# Patient Record
Sex: FEMALE | Race: OTHER | HISPANIC OR LATINO | URBAN - METROPOLITAN AREA
[De-identification: names, ages, dates, MRNs, and addresses within clinical notes are randomized per-mention and may not be internally consistent; named-entity substitution may affect disease eponyms.]

---

## 2019-06-07 ENCOUNTER — EMERGENCY (EMERGENCY)
Facility: HOSPITAL | Age: 49
LOS: 1 days | Discharge: HOME | End: 2019-06-07
Attending: EMERGENCY MEDICINE
Payer: SELF-PAY

## 2019-06-07 VITALS
DIASTOLIC BLOOD PRESSURE: 81 MMHG | HEART RATE: 90 BPM | SYSTOLIC BLOOD PRESSURE: 140 MMHG | RESPIRATION RATE: 18 BRPM | WEIGHT: 163.14 LBS | TEMPERATURE: 98 F | HEIGHT: 65 IN

## 2019-06-07 LAB
ALBUMIN SERPL ELPH-MCNC: 4 G/DL — SIGNIFICANT CHANGE UP (ref 3.3–5.2)
ALP SERPL-CCNC: 71 U/L — SIGNIFICANT CHANGE UP (ref 40–120)
ALT FLD-CCNC: 14 U/L — SIGNIFICANT CHANGE UP
ANION GAP SERPL CALC-SCNC: 13 MMOL/L — SIGNIFICANT CHANGE UP (ref 5–17)
APTT BLD: 30.6 SEC — SIGNIFICANT CHANGE UP (ref 27.5–36.3)
AST SERPL-CCNC: 14 U/L — SIGNIFICANT CHANGE UP
BILIRUB SERPL-MCNC: 0.3 MG/DL — LOW (ref 0.4–2)
BUN SERPL-MCNC: 17 MG/DL — SIGNIFICANT CHANGE UP (ref 8–20)
CALCIUM SERPL-MCNC: 9.5 MG/DL — SIGNIFICANT CHANGE UP (ref 8.6–10.2)
CHLORIDE SERPL-SCNC: 105 MMOL/L — SIGNIFICANT CHANGE UP (ref 98–107)
CO2 SERPL-SCNC: 22 MMOL/L — SIGNIFICANT CHANGE UP (ref 22–29)
CREAT SERPL-MCNC: 0.65 MG/DL — SIGNIFICANT CHANGE UP (ref 0.5–1.3)
GLUCOSE SERPL-MCNC: 92 MG/DL — SIGNIFICANT CHANGE UP (ref 70–115)
HCT VFR BLD CALC: 37 % — SIGNIFICANT CHANGE UP (ref 37–47)
HGB BLD-MCNC: 11.8 G/DL — LOW (ref 12–16)
INR BLD: 0.97 RATIO — SIGNIFICANT CHANGE UP (ref 0.88–1.16)
LIDOCAIN IGE QN: 32 U/L — SIGNIFICANT CHANGE UP (ref 22–51)
MCHC RBC-ENTMCNC: 24.4 PG — LOW (ref 27–31)
MCHC RBC-ENTMCNC: 31.9 G/DL — LOW (ref 32–36)
MCV RBC AUTO: 76.4 FL — LOW (ref 81–99)
NT-PROBNP SERPL-SCNC: 176 PG/ML — SIGNIFICANT CHANGE UP (ref 0–300)
PLATELET # BLD AUTO: 299 K/UL — SIGNIFICANT CHANGE UP (ref 150–400)
POTASSIUM SERPL-MCNC: 4.1 MMOL/L — SIGNIFICANT CHANGE UP (ref 3.5–5.3)
POTASSIUM SERPL-SCNC: 4.1 MMOL/L — SIGNIFICANT CHANGE UP (ref 3.5–5.3)
PROT SERPL-MCNC: 7.2 G/DL — SIGNIFICANT CHANGE UP (ref 6.6–8.7)
PROTHROM AB SERPL-ACNC: 11.2 SEC — SIGNIFICANT CHANGE UP (ref 10–12.9)
RBC # BLD: 4.84 M/UL — SIGNIFICANT CHANGE UP (ref 4.4–5.2)
RBC # FLD: 24.9 % — HIGH (ref 11–15.6)
SODIUM SERPL-SCNC: 140 MMOL/L — SIGNIFICANT CHANGE UP (ref 135–145)
TROPONIN T SERPL-MCNC: <0.01 NG/ML — SIGNIFICANT CHANGE UP (ref 0–0.06)
TROPONIN T SERPL-MCNC: <0.01 NG/ML — SIGNIFICANT CHANGE UP (ref 0–0.06)
WBC # BLD: 11.7 K/UL — HIGH (ref 4.8–10.8)
WBC # FLD AUTO: 11.7 K/UL — HIGH (ref 4.8–10.8)

## 2019-06-07 PROCEDURE — 99218: CPT

## 2019-06-07 PROCEDURE — 93010 ELECTROCARDIOGRAM REPORT: CPT

## 2019-06-07 PROCEDURE — 71045 X-RAY EXAM CHEST 1 VIEW: CPT | Mod: 26

## 2019-06-07 RX ORDER — ACETAMINOPHEN 500 MG
650 TABLET ORAL EVERY 6 HOURS
Refills: 0 | Status: DISCONTINUED | OUTPATIENT
Start: 2019-06-07 | End: 2019-06-12

## 2019-06-07 NOTE — ED CDU PROVIDER INITIAL DAY NOTE - ATTENDING CONTRIBUTION TO CARE
CP WORK UP  RECENT HX PERICARDITIS AS PER PT IN HER COUN[place]  I, Michelle Pittman, performed the initial face to face bedside interview with this patient regarding history of present illness, review of symptoms and relevant past medical, social and family history.  I completed an independent physical examination.  I was the initial provider who evaluated this patient. I have signed out the follow up of any pending tests (i.e. labs, radiological studies) to the ACP.  I have communicated the patient’s plan of care and disposition with the ACP.

## 2019-06-07 NOTE — ED CDU PROVIDER INITIAL DAY NOTE - OBJECTIVE STATEMENT
47yo F pmhx HTN, pericarditis x 1 month ago, spontaneous pneumothorax in 2007 presents to ED with chest pain, midsternal radiates up with palpitations, feels heart beating in chest x 1 week. Pain is intermittent and not exertional, episode usually lasts 10 minutes and self-resolves. Not worse with lying down flat. No further complaints. No hx MI or CVA. No fevers/chills, no SOB, diaphoresis, left arm pain, numbness/tingling/weakness, dizziness. Pt has seen cardiologist in [place] due to pericarditis but never in [place].

## 2019-06-07 NOTE — ED ADULT NURSE NOTE - OBJECTIVE STATEMENT
48 year old female, PMHx of asthma and pericarditis. presents to the ED with intermittent mid sternal chest pain x 1 weeks. Patient states that the pain feels like a squeezing. Patient denies any SOB, dizziness, lightheadedness, diaphoresis, N/V/D, fevers or chills. Patient states that she is having some chest discomfort at this time. Patient NSR on the monitor. respirations are even and non labored.

## 2019-06-07 NOTE — ED PROVIDER NOTE - NS ED ROS FT
ROS: +CP no SOB. +palpations. no cough. no fever. no n/v/d/c. no abd pain. no rash. no bleeding. no urinary complaints. no weakness. no vision changes. no HA. no neck/back pain. no extremity swelling/deformity. No change in mental status.

## 2019-06-07 NOTE — ED ADULT NURSE NOTE - CHPI ED NUR SYMPTOMS NEG
no back pain/no dizziness/no diaphoresis/no fever/no shortness of breath/no vomiting/no nausea/no congestion/no syncope/no chills

## 2019-06-07 NOTE — ED ADULT NURSE REASSESSMENT NOTE - GENERAL PATIENT STATE
improvement verbalized/family/SO at bedside/comfortable appearance/resting/sleeping/smiling/interactive

## 2019-06-07 NOTE — ED ADULT TRIAGE NOTE - CHIEF COMPLAINT QUOTE
pt reports chest pain x 1 week, has a history of Pericarditis. [place]. denies SOB, denies recent illness denies fevers.

## 2019-06-07 NOTE — ED STATDOCS - NS_ ATTENDINGSCRIBEDETAILS _ED_A_ED_FT
I, Uche Golden, performed the initial face to face bedside interview with this patient regarding history of present illness, review of symptoms and relevant past medical, social and family history.  I completed an independent physical examination.   The history, relevant review of systems, past medical and surgical history, medical decision making, and physical examination was documented by the scribe in my presence and I attest to the accuracy of the documentation.

## 2019-06-07 NOTE — ED STATDOCS - PROGRESS NOTE DETAILS
Fracisco Hayes for Dr Uche Golden : Pt is a 47 y/o F, with PMHx of pericarditis, presenting to the ED with c/o chest pain. Pt reports intermittent, waxing and waning, substernal chest pain over the last month. also feels like her heart has been racing over the same time frame. Denies fever, cough, N/V, SOB, abd pain. Pt reports having pericarditis about one month ago, which she is taking aspirin daily for. States her pain today feels familiar. No FHx of cardiac disease. NO other daily meds. Denies smoking, drugs, and alcohol use. Sent to [facility] for further evaluation.   EKG shows TWI. Fracisco Hayes for Dr Uche Golden : Pt is a 47 y/o F, with PMHx of pericarditis, presenting to the ED with c/o chest pain. Pt reports intermittent, waxing and waning, substernal chest pain over the last month. also feels like her heart has been racing over the same time frame. Denies fever, cough, N/V, SOB, abd pain. Pt reports having pericarditis about one month ago, which she is taking aspirin daily for. States her pain today feels familiar. No FHx of cardiac disease. NO other daily meds. Denies smoking, drugs, and alcohol use. Sent to [facility] for further evaluation.   EKG shows TWI, in V2-v6

## 2019-06-07 NOTE — ED PROVIDER NOTE - OBJECTIVE STATEMENT
47yo F with chest pain midsternal radiates up with palpitations, feels heart beating in chest. not exertional. not worse with lying down. no fevers. no SOB. burning/sharp in nature. had pericarditis last month on ASA. PMH- HTN

## 2019-06-07 NOTE — ED PROVIDER NOTE - CLINICAL SUMMARY MEDICAL DECISION MAKING FREE TEXT BOX
EKG with t wave inversions, no previous. CP resolved. monitos. labs. serial trps. will need formal echo. obs. cards consult

## 2019-06-07 NOTE — ED PROVIDER NOTE - CARE PLAN
Principal Discharge DX:	Chest pain Principal Discharge DX:	Chest pain  Secondary Diagnosis:	Abnormal ECG

## 2019-06-07 NOTE — ED CDU PROVIDER INITIAL DAY NOTE - NS ED ROS FT
Gen: denies weakness, malaise/fatigue, fever, chills  Skin: denies rashes, hives  HEENT: denies headaches, LOC, visual changes, congestion  Respiratory: denies cough, dyspnea, RIVERA, SOB, wheezing  Cardiovascular: +CP, Palpitations. Denies diaphoresis, LE edema  GI: denies abdominal pain, nausea/vomiting, diarrhea/constipation  : denies dysuria, hematuria, frequency, urgency, hesitancy, incontinence of bowel/bladder  MSK: denies limitation on movement, weakness, joint swelling/redness/warmth  Neuro: denies LOC, convulsions/seizures, syncope, headache, dizziness, vertigo, numbness/tingling

## 2019-06-07 NOTE — ED ADULT NURSE NOTE - CAS EDN DISCHARGE ASSESSMENT
Awake/Symptoms improved/Dressing clean and dry/Patient baseline mental status/Alert and oriented to person, place and time

## 2019-06-07 NOTE — ED PROVIDER NOTE - PROGRESS NOTE DETAILS
trop negativer, bedside sono trace pericardial effusion. will obs for echo, serial trop and cards consult due to echo changes -Slowey DO

## 2019-06-08 VITALS
RESPIRATION RATE: 16 BRPM | OXYGEN SATURATION: 99 % | HEART RATE: 63 BPM | DIASTOLIC BLOOD PRESSURE: 75 MMHG | TEMPERATURE: 98 F | SYSTOLIC BLOOD PRESSURE: 118 MMHG

## 2019-06-08 LAB — TROPONIN T SERPL-MCNC: <0.01 NG/ML — SIGNIFICANT CHANGE UP (ref 0–0.06)

## 2019-06-08 PROCEDURE — 93306 TTE W/DOPPLER COMPLETE: CPT

## 2019-06-08 PROCEDURE — G0378: CPT

## 2019-06-08 PROCEDURE — 80053 COMPREHEN METABOLIC PANEL: CPT

## 2019-06-08 PROCEDURE — 85610 PROTHROMBIN TIME: CPT

## 2019-06-08 PROCEDURE — 83690 ASSAY OF LIPASE: CPT

## 2019-06-08 PROCEDURE — 99217: CPT

## 2019-06-08 PROCEDURE — 99284 EMERGENCY DEPT VISIT MOD MDM: CPT | Mod: 25

## 2019-06-08 PROCEDURE — 85027 COMPLETE CBC AUTOMATED: CPT

## 2019-06-08 PROCEDURE — 71045 X-RAY EXAM CHEST 1 VIEW: CPT

## 2019-06-08 PROCEDURE — 36415 COLL VENOUS BLD VENIPUNCTURE: CPT

## 2019-06-08 PROCEDURE — 85730 THROMBOPLASTIN TIME PARTIAL: CPT

## 2019-06-08 PROCEDURE — 93005 ELECTROCARDIOGRAM TRACING: CPT

## 2019-06-08 PROCEDURE — 83880 ASSAY OF NATRIURETIC PEPTIDE: CPT

## 2019-06-08 PROCEDURE — T1013: CPT

## 2019-06-08 PROCEDURE — 84484 ASSAY OF TROPONIN QUANT: CPT

## 2019-06-08 RX ORDER — PANTOPRAZOLE SODIUM 20 MG/1
1 TABLET, DELAYED RELEASE ORAL
Qty: 30 | Refills: 0
Start: 2019-06-08 | End: 2019-07-07

## 2019-06-08 NOTE — CONSULT NOTE ADULT - ATTENDING COMMENTS
49 y/o female with recent history of pericarditis as per patient treated with NSAID For 3 weeks and she is on ASA low dose daily   Pain more consistent with GI etiology   Echo normal LV function, no RWMA, EF 60%  Recommend treatment with PPCI   can discontinue ASA

## 2019-06-08 NOTE — ED CDU PROVIDER DISPOSITION NOTE - ATTENDING CONTRIBUTION TO CARE
I, Leni Jordan MD have personally performed a face to face diagnostic evaluation on this patient.  I have reviewed the ACP note and agree with the history, exam, and plan of care, except as noted.     Exam:    eyes: perrla eomi  heart: rrr s1s2  lungs: ctab  abd: soft, nt nd +bs no rebound/guarding no cva ttp  skin: warm  LE: no swelling, no calf ttp  back: no midline cervical/thoracic/lumbar ttp    -->PT CURRENTLY asymptomatic in cdu for acs work up; seen by cardiology; echo wnl; 3trops neg--ok to dc

## 2019-06-08 NOTE — ED CDU PROVIDER SUBSEQUENT DAY NOTE - HISTORY
49yo F pmhx HTN, pericarditis x 1 month ago, spontaneous pneumothorax in 2007 presents to ED with chest pain, midsternal radiates up. Pt admitted to Observation to r/o ACS- trop neg. Pt reports feeling better. Pt pending echo and cardio eval

## 2019-06-08 NOTE — ED CDU PROVIDER SUBSEQUENT DAY NOTE - ATTENDING CONTRIBUTION TO CARE
I, Leni Jordan, performed the initial face to face bedside interview with this patient regarding history of present illness, review of symptoms and relevant past medical, social and family history.  I completed an independent physical examination.  I was the initial provider who evaluated this patient. I have signed out the follow up of any pending tests (i.e. labs, radiological studies) to the ACP.  I have communicated the patient’s plan of care and disposition with the ACP.

## 2019-06-08 NOTE — ED ADULT NURSE REASSESSMENT NOTE - NSIMPLEMENTINTERV_GEN_ALL_ED
Implemented All Universal Safety Interventions:  [place] to call system. Call bell, personal items and telephone within reach. Instruct patient to call for assistance. Room bathroom lighting operational. Non-slip footwear when patient is off stretcher. Physically safe environment: no spills, clutter or unnecessary equipment. Stretcher in lowest position, wheels locked, appropriate side rails in place.

## 2019-06-08 NOTE — ED ADULT NURSE REASSESSMENT NOTE - NS ED NURSE REASSESS COMMENT FT1
Pt alert and oriented. resting in stretcher, no signs of distress noted. Handoff report given to Tae Ang RN and pt's safety maintained. RN with no questions or concerns at this time
pt sleeping in stretcher, no apparent distress noted.  family at bedside. bed in lowest position and safety maintained.
report received pt from previous Rn. Norma Ceja. pt currently off unit at  TTE echo.
Report received from offgoing RN, charting as noted.   assisted with interview. Patient is A&Ox4, denies any pain or discomfort.  Patient is normal sinus on monitor, explained that she will stay for obs and get repeat cardiac enzymes and echo.  pt expressed understanding.  denies symptoms at this time.  respirations are even and unlabored.
pt returned back from TTE echo.  transported to CDU-6 for observation. pt predominately Slovenian speaking. assessment done with ED  Stephanie Rosario.  A&Ox3;  resting in stretcher,  talking with family at bedside. with no complaints of pain or discomfort. B/L lungs clear, normal s1&s2 heard on ausculation. (+) pedal pulses, skin warm/dry. PIV patent. VSS and documented as per flow sheet. awaiting H cards in am plan of care reviewed and pt verbalizes understanding. bed in lowest position, call bell within reach and safety maintained. monitoring ongoing for any changes.

## 2019-06-08 NOTE — ED CDU PROVIDER DISPOSITION NOTE - CLINICAL COURSE
47yo F pmhx HTN, pericarditis x 1 month ago, spontaneous pneumothorax in 2007 presents to ED with chest pain, midsternal radiates up. Pt admitted to Observation to r/o ACS- echo and trop neg. Pt seen by Cardio who felt pain with likley GI and nature and recommending dc home with [facility] f/u and PPI

## 2019-06-08 NOTE — ED ADULT NURSE REASSESSMENT NOTE - GENERAL PATIENT STATE
resting/sleeping/smiling/interactive/improvement verbalized/comfortable appearance/cooperative/family/SO at bedside

## 2019-06-08 NOTE — CONSULT NOTE ADULT - SUBJECTIVE AND OBJECTIVE BOX
[facility]                                                        Office: [address] [address]                                                       Telephone: [phone]. Fax:[phone]                                                              CARDIOLOGY CONSULTATION NOTE                                                                                             Consult requested by:  Dr. Golden    Reason for Consultation: Chest pain    History obtained by: Patient and medical record     obtained: Yes, Tanvi     Chief complaint:    Patient is a 48y old  Female who presents with a chief complaint of Chest pain     HPI: 49 y/o female with PMH asthma, HTN, recent dx pericarditis on asa daily presents to ED with c/o chest pain, starting in abd radiating up through chest to neck, worse after eating, lasting approximately 10 minutes, better with sitting up. Denies shortness of breath. Pt takes care of grandchildren during the day, no chest pain, shortness of breath with activity. Denies fever, chills, cough, phlegm production, shortness of breath, dyspnea on exertion, orthopnea, PND, edema, pressure, palpitations, irregular, fast heart beat, nausea, vomiting, melena, rectal bleed, hematuria, lightheadedness, dizziness, syncope, near syncope.      REVIEW OF SYMPTOMS: Cardiovascular:  See HPI. No dyspnea,  No syncope,  No palpitations, No dizziness, No Orthopnea,      No Paroxsymal nocturnal dyspnea;  Respiratory:  No Dyspnea, No cough, Genitourinary:  No dysuria, no hematuria; Gastrointestinal:  No nausea, no vomiting. No diarrhea.  No abdominal pain. No dark color stool, no melena ; Neurological: No headache, no dizziness, no slurred speech;  Psychiatric: No agitation, no anxiety.  ALL OTHER REVIEW OF SYSTEMS ARE NEGATIVE.    ALLERGIES: Allergies    No Known Allergies    Intolerances      CURRENT MEDICATIONS:         HOME MEDICATIONS:      PAST MEDICAL HISTORY  Asthma      PAST SURGICAL HISTORY  No significant past surgical history      FAMILY HISTORY:      SOCIAL HISTORY:  Denies smoking/alcohol/drugs      Vital Signs Last 24 Hrs  T(C): 36.9 (08 Jun 2019 07:43), Max: 36.9 (08 Jun 2019 07:43)  T(F): 98.5 (08 Jun 2019 07:43), Max: 98.5 (08 Jun 2019 07:43)  HR: 67 (08 Jun 2019 07:43) (61 - 90)  BP: 133/83 (08 Jun 2019 07:43) (112/72 - 140/81)  RR: 16 (08 Jun 2019 07:43) (16 - 18)  SpO2: 100% (08 Jun 2019 07:43) (99% - 100%)      PHYSICAL EXAM:  Constitutional: Comfortable . No acute distress.   HEENT: Atraumatic and normcephalic , neck is supple . no JVD. No carotid bruit. PEERL   CNS: A&Ox3. No focal deficits. EOMI. Cranial nerves II-IX are intact.   Lymph Nodes: Cervical : Not palpable.  Respiratory: CTAB  Cardiovascular: S1S2 RRR. No murmur/rubs or gallop.  Gastrointestinal: Soft non-tender and non distended . +Bowel sounds. negative Castaneda's sign.  Extremities: No edema.   Psychiatric: Calm. no agitation.  Skin: No skin rash/ulcers visualized to face, hands or feet.      LABS:                        11.8   11.7  )-----------( 299      ( 07 Jun 2019 17:34 )             37.0     06-07    140  |  105  |  17.0  ----------------------------<  92  4.1   |  22.0  |  0.65    Ca    9.5      07 Jun 2019 17:34    TPro  7.2  /  Alb  4.0  /  TBili  0.3<L>  /  DBili  x   /  AST  14  /  ALT  14  /  AlkPhos  71  06-07    CARDIAC MARKERS ( 08 Jun 2019 02:37 )  x     / <0.01 ng/mL / x     / x     / x      CARDIAC MARKERS ( 07 Jun 2019 20:58 )  x     / <0.01 ng/mL / x     / x     / x      CARDIAC MARKERS ( 07 Jun 2019 17:34 )  x     / <0.01 ng/mL / x     / x     / x        ;p-BNP=Serum Pro-Brain Natriuretic Peptide: 176 pg/mL (06-07 @ 17:34)    PT/INR - ( 07 Jun 2019 17:34 )   PT: 11.2 sec;   INR: 0.97 ratio    PTT - ( 07 Jun 2019 17:34 )  PTT:30.6 sec      INTERPRETATION OF TELEMETRY: SR, SB, no ectopy no arrythmia  ECG: SR- TWI V2-5       RADIOLOGY & ADDITIONAL STUDIES:    X-ray:  reviewed by me.   CT scan:     MRI:   ECHO FINDINGS: [place] [facility]                                                        Office: [address] [address]                                                       Telephone: [phone]. Fax:[phone]                                                              CARDIOLOGY CONSULTATION NOTE                                                                                             Consult requested by:  Dr. Golden    Reason for Consultation: Chest pain    History obtained by: Patient and medical record     obtained: Yes, Tanvi     Chief complaint:    Patient is a 48y old  Female who presents with a chief complaint of Chest pain     HPI: 47 y/o female with PMH asthma, HTN, recent dx pericarditis on asa daily presents to ED with c/o chest pain, starting in abd radiating up through chest to neck, worse after eating, lasting approximately 10 minutes, better with sitting up. Denies shortness of breath. Pt takes care of grandchildren during the day, no chest pain, shortness of breath with activity. Denies fever, chills, cough, phlegm production, shortness of breath, dyspnea on exertion, orthopnea, PND, edema, pressure, palpitations, irregular, fast heart beat, nausea, vomiting, melena, rectal bleed, hematuria, lightheadedness, dizziness, syncope, near syncope.      REVIEW OF SYMPTOMS: Cardiovascular:  See HPI. No dyspnea,  No syncope,  No palpitations, No dizziness, No Orthopnea,      No Paroxsymal nocturnal dyspnea;  Respiratory:  No Dyspnea, No cough, Genitourinary:  No dysuria, no hematuria; Gastrointestinal:  No nausea, no vomiting. No diarrhea.  No abdominal pain. No dark color stool, no melena ; Neurological: No headache, no dizziness, no slurred speech;  Psychiatric: No agitation, no anxiety.  ALL OTHER REVIEW OF SYSTEMS ARE NEGATIVE.    ALLERGIES: Allergies    No Known Allergies    Intolerances      CURRENT MEDICATIONS:         HOME MEDICATIONS:      PAST MEDICAL HISTORY  Asthma      PAST SURGICAL HISTORY  No significant past surgical history      FAMILY HISTORY:      SOCIAL HISTORY:  Denies smoking/alcohol/drugs      Vital Signs Last 24 Hrs  T(C): 36.9 (08 Jun 2019 07:43), Max: 36.9 (08 Jun 2019 07:43)  T(F): 98.5 (08 Jun 2019 07:43), Max: 98.5 (08 Jun 2019 07:43)  HR: 67 (08 Jun 2019 07:43) (61 - 90)  BP: 133/83 (08 Jun 2019 07:43) (112/72 - 140/81)  RR: 16 (08 Jun 2019 07:43) (16 - 18)  SpO2: 100% (08 Jun 2019 07:43) (99% - 100%)      PHYSICAL EXAM:  Constitutional: Comfortable . No acute distress.   HEENT: Atraumatic and normcephalic , neck is supple . no JVD. No carotid bruit. PEERL   CNS: A&Ox3. No focal deficits. EOMI. Cranial nerves II-IX are intact.   Lymph Nodes: Cervical : Not palpable.  Respiratory: CTAB  Cardiovascular: S1S2 RRR. No murmur/rubs or gallop.  Gastrointestinal: Soft non-tender and non distended . +Bowel sounds. negative Castaneda's sign.  Extremities: No edema.   Psychiatric: Calm. no agitation.  Skin: No skin rash/ulcers visualized to face, hands or feet.      LABS:                        11.8   11.7  )-----------( 299      ( 07 Jun 2019 17:34 )             37.0     06-07    140  |  105  |  17.0  ----------------------------<  92  4.1   |  22.0  |  0.65    Ca    9.5      07 Jun 2019 17:34    TPro  7.2  /  Alb  4.0  /  TBili  0.3<L>  /  DBili  x   /  AST  14  /  ALT  14  /  AlkPhos  71  06-07    CARDIAC MARKERS ( 08 Jun 2019 02:37 )  x     / <0.01 ng/mL / x     / x     / x      CARDIAC MARKERS ( 07 Jun 2019 20:58 )  x     / <0.01 ng/mL / x     / x     / x      CARDIAC MARKERS ( 07 Jun 2019 17:34 )  x     / <0.01 ng/mL / x     / x     / x        ;p-BNP=Serum Pro-Brain Natriuretic Peptide: 176 pg/mL (06-07 @ 17:34)    PT/INR - ( 07 Jun 2019 17:34 )   PT: 11.2 sec;   INR: 0.97 ratio    PTT - ( 07 Jun 2019 17:34 )  PTT:30.6 sec      INTERPRETATION OF TELEMETRY: SR, SB, no ectopy no arrythmia  ECG: SR- TWI V2-5       RADIOLOGY & ADDITIONAL STUDIES:    X-ray:  reviewed by me.   CT scan:     MRI:   ECHO FINDINGS:   < from: TTE Echo Complete w/Doppler (06.07.19 @ 21:19) >    Summary:   1. Left ventricular ejection fraction, by visual estimation, is 60 to   65%.   2. Technically difficult study.   3. The aortic valve mean gradient is 5.3 mmHg consistent with normally   opening aortic valve.    MD Hien Electronically signed on 6/8/2019 at 10:34:50   AM    < end of copied text >

## 2019-06-08 NOTE — CONSULT NOTE ADULT - ASSESSMENT
47 y/o female with PMH asthma, HTN, recent dx pericarditis on asa daily presents to ED with c/o chest pain, starting in abd radiating up through chest to neck, worse after eating, lasting approximately 10 minutes, better with sitting up. Denies shortness of breath. Pt takes care of grandchildren during the day, no chest pain, shortness of breath with activity.     Chest Pain   - Troponin neg x3  - EKG- SR, TW abnormality V2-V5  - TTE pending- awaiting results.  - recent history of pericarditis- asa daily x 1 month, no PPI, pain worse after eating ?GI     full consult to follow awaitng offical recs 47 y/o female with PMH asthma, HTN, recent dx pericarditis on asa daily presents to ED with c/o chest pain, starting in abd radiating up through chest to neck, worse after eating, lasting approximately 10 minutes, better with sitting up. Denies shortness of breath. Pt takes care of grandchildren during the day, no chest pain, shortness of breath with activity.     Chest Pain   - Troponin neg x3  - EKG- SR, TW abnormality V2-V5  - TTE- EF 60-65%. normal LV size, function  - recent history of pericarditis- asa daily x 1 month, no PPI, pain worse after eating, on ultram for 3 weeks, stopped 2 weeks ago. Pt was taking Ultram when she developed the epigastric/chest pain.   - atypical chest pain more likely GI in origin.  full consult to follow awaitng offical recs 49 y/o female with PMH asthma, HTN, recent dx pericarditis on asa daily presents to ED with c/o chest pain, starting in abd radiating up through chest to neck, worse after eating, lasting approximately 10 minutes, better with sitting up. Denies shortness of breath. Pt takes care of grandchildren during the day, no chest pain, shortness of breath with activity.     Chest Pain   - Troponin neg x3  - EKG- SR, TW abnormality V2-V5  - TTE- EF 60-65%. normal LV size, function  - recent history of pericarditis- asa daily x 1 month, no PPI, pain worse after eating, on ultram for 3 weeks, stopped 2 weeks ago. Pt was taking Ultram when she developed the epigastric/chest pain.   - atypical chest pain more likely GI in origin.   - d/c home can follow up in H clinic. Start on PPI. does not need to continue asa at this time. 47 y/o female with PMH asthma, HTN, recent dx pericarditis on asa daily presents to ED with c/o chest pain, starting in abd radiating up through chest to neck, worse after eating, lasting approximately 10 minutes, better with sitting up. Denies shortness of breath. Pt takes care of grandchildren during the day, no chest pain, shortness of breath with activity.     Chest Pain   - Troponin neg x3  - EKG- SR, TW abnormality V2-V5  - TTE- EF 60-65%. normal LV size, function  - recent history of pericarditis-   asa daily x 1 month, no PPI, pain worse after eating, on ultram for 3 weeks, stopped 2 weeks ago. Pt was taking Ultram when she developed the epigastric/chest pain.   - atypical chest pain more likely GI in origin.   - d/c home can follow up in H clinic. Start on PPI. does not need to continue asa at this time.

## 2019-12-29 ENCOUNTER — EMERGENCY (EMERGENCY)
Facility: HOSPITAL | Age: 49
LOS: 1 days | Discharge: HOME | End: 2019-12-29
Attending: EMERGENCY MEDICINE
Payer: SELF-PAY

## 2019-12-29 VITALS — TEMPERATURE: 100 F | HEART RATE: 94 BPM | OXYGEN SATURATION: 100 % | RESPIRATION RATE: 18 BRPM

## 2019-12-29 VITALS
TEMPERATURE: 103 F | RESPIRATION RATE: 20 BRPM | DIASTOLIC BLOOD PRESSURE: 71 MMHG | WEIGHT: 154.98 LBS | HEIGHT: 66 IN | SYSTOLIC BLOOD PRESSURE: 115 MMHG | HEART RATE: 111 BPM | OXYGEN SATURATION: 99 %

## 2019-12-29 PROBLEM — J45.909 UNSPECIFIED ASTHMA, UNCOMPLICATED: Chronic | Status: ACTIVE | Noted: 2019-06-07

## 2019-12-29 LAB — S PYO AG SPEC QL IA: NEGATIVE — SIGNIFICANT CHANGE UP

## 2019-12-29 PROCEDURE — 99284 EMERGENCY DEPT VISIT MOD MDM: CPT

## 2019-12-29 PROCEDURE — 99283 EMERGENCY DEPT VISIT LOW MDM: CPT | Mod: 25

## 2019-12-29 PROCEDURE — 71046 X-RAY EXAM CHEST 2 VIEWS: CPT | Mod: 26

## 2019-12-29 PROCEDURE — 87880 STREP A ASSAY W/OPTIC: CPT

## 2019-12-29 PROCEDURE — 71046 X-RAY EXAM CHEST 2 VIEWS: CPT

## 2019-12-29 PROCEDURE — 87081 CULTURE SCREEN ONLY: CPT

## 2019-12-29 PROCEDURE — T1013: CPT

## 2019-12-29 RX ORDER — ACETAMINOPHEN 500 MG
650 TABLET ORAL ONCE
Refills: 0 | Status: COMPLETED | OUTPATIENT
Start: 2019-12-29 | End: 2019-12-29

## 2019-12-29 RX ORDER — AZITHROMYCIN 500 MG/1
1 TABLET, FILM COATED ORAL
Qty: 4 | Refills: 0
Start: 2019-12-29 | End: 2020-01-01

## 2019-12-29 RX ORDER — IBUPROFEN 200 MG
400 TABLET ORAL ONCE
Refills: 0 | Status: DISCONTINUED | OUTPATIENT
Start: 2019-12-29 | End: 2019-12-29

## 2019-12-29 RX ORDER — CEFPODOXIME PROXETIL 100 MG
1 TABLET ORAL
Qty: 10 | Refills: 0
Start: 2019-12-29 | End: 2020-01-02

## 2019-12-29 RX ORDER — SODIUM CHLORIDE 9 MG/ML
1000 INJECTION INTRAMUSCULAR; INTRAVENOUS; SUBCUTANEOUS ONCE
Refills: 0 | Status: COMPLETED | OUTPATIENT
Start: 2019-12-29 | End: 2019-12-29

## 2019-12-29 RX ORDER — AZITHROMYCIN 500 MG/1
500 TABLET, FILM COATED ORAL ONCE
Refills: 0 | Status: COMPLETED | OUTPATIENT
Start: 2019-12-29 | End: 2019-12-29

## 2019-12-29 RX ORDER — CEFPODOXIME PROXETIL 100 MG
200 TABLET ORAL ONCE
Refills: 0 | Status: COMPLETED | OUTPATIENT
Start: 2019-12-29 | End: 2019-12-29

## 2019-12-29 RX ADMIN — AZITHROMYCIN 500 MILLIGRAM(S): 500 TABLET, FILM COATED ORAL at 18:28

## 2019-12-29 RX ADMIN — Medication 650 MILLIGRAM(S): at 16:54

## 2019-12-29 RX ADMIN — Medication 200 MILLIGRAM(S): at 18:28

## 2019-12-29 RX ADMIN — SODIUM CHLORIDE 1000 MILLILITER(S): 9 INJECTION INTRAMUSCULAR; INTRAVENOUS; SUBCUTANEOUS at 16:52

## 2019-12-29 NOTE — ED PROVIDER NOTE - OBJECTIVE STATEMENT
50 y/o F arrived [place] 12/23 from [facility] for holiday vacation, presents with c/o 2 days of cough, sore throat, difficulty breathing, body aches, nasal congestion, and fever.  She took ibuprofen 600mg at 2:00.  Denies CP or SOB.  PMH asthma has inhaler and nasal spray from [place]. 48 y/o F [place] 12[place]23 from [facility] for holiday vacation, presents with c/o 2 days of cough, sore throat, difficulty breathing, body aches, nasal congestion, and fever.  She took ibuprofen 600mg at 2:00.  Denies CP or SOB.  PMH asthma has inhaler and nasal spray from [place]. : Cora

## 2019-12-29 NOTE — ED PROVIDER NOTE - PROGRESS NOTE DETAILS
NP NOTE:  Patient feeling better.  CXR with [facility] will treat with azithromycin and cefpodoxime x 5 days, refer to [facility] for f/u.

## 2019-12-29 NOTE — ED PROVIDER NOTE - PATIENT PORTAL LINK FT
You can access the FollowMyHealth Patient Portal offered by [facility] by registering at the following website: [email or web address]. By joining [facility]’s FollowMyHealth portal, you will also be able to view your health information using other applications (apps) compatible with our system.

## 2019-12-29 NOTE — ED PROVIDER NOTE - ATTENDING CONTRIBUTION TO CARE
Sarahi: I performed a face to face bedside interview with patient regarding history of present illness, review of symptoms and past medical history. I completed an independent physical exam.  I have discussed patient's plan of care with advanced care provider.   I agree with note as stated above including HISTORY OF PRESENT ILLNESS, HIV, PAST MEDICAL/SURGICAL/FAMILY/SOCIAL HISTORY, ALLERGIES AND HOME MEDICATIONS, REVIEW OF SYSTEMS, PHYSICAL EXAM, MEDICAL DECISION MAKING and any PROGRESS NOTES during the time I functioned as the attending physician for this patient  unless otherwise noted. My brief assessment is as follows: 49F p/w cough, sore throat, myalgias, nasal congestion x 2 days.   Gen: Well appearing in mild discomfort  Head: NC/AT  Neck: trachea midline  Resp:  No distress, CTAB  CV: RRR  Ext: no deformities  Neuro:  A&O appears non focal  Skin:  Warm and dry as visualized  Psych:  Normal affect and mood    Likely URI/influenza, concern for possible PNA. Plan for IVF, tylenol, cxr, reassess.

## 2019-12-29 NOTE — ED PROVIDER NOTE - NSFOLLOWUPINSTRUCTIONS_ED_ALL_ED_FT
Pneumonia    Pneumonia is an infection of the lungs. Pneumonia may be caused by bacteria, viruses, or funguses. Symptoms include coughing, fever, chest pain when breathing deeply or coughing, shortness of breath, fatigue, or muscle aches. Pneumonia can be diagnosed with a medical history and physical exam, as well as other tests which may include a chest X-ray. If you were prescribed an antibiotic medicine, take it as told by your health care provider and do not stop taking the antibiotic even if you start to feel better. Do not use tobacco products, including cigarettes, chewing tobacco, and e-cigarettes.    SEEK IMMEDIATE MEDICAL CARE IF YOU HAVE ANY OF THE FOLLOWING SYMPTOMS: worsening shortness of breath, worsening chest pain, coughing up blood, change in mental status, lightheadedness/dizziness.   1) AT [facility] ARE 2 ANTIBIOTICS, TAKE AS DIRECTED.

## 2019-12-29 NOTE — ED PROVIDER NOTE - CLINICAL SUMMARY MEDICAL DECISION MAKING FREE TEXT BOX
48 y/o F with flu-like symptoms x 2 days.  Will obtain strep test, cxr to r/o pna, treat fever, hydrate 50 y/o F non-toxic in appearance with flu-like symptoms x 2 days.  Will obtain strep test, cxr to r/o pna, treat fever, hydrate

## 2020-01-10 ENCOUNTER — EMERGENCY (EMERGENCY)
Facility: HOSPITAL | Age: 50
LOS: 1 days | Discharge: HOME | End: 2020-01-10
Attending: EMERGENCY MEDICINE
Payer: SELF-PAY

## 2020-01-10 VITALS
OXYGEN SATURATION: 98 % | SYSTOLIC BLOOD PRESSURE: 137 MMHG | DIASTOLIC BLOOD PRESSURE: 68 MMHG | WEIGHT: 179.9 LBS | TEMPERATURE: 99 F | HEART RATE: 68 BPM | RESPIRATION RATE: 18 BRPM | HEIGHT: 62 IN

## 2020-01-10 VITALS
RESPIRATION RATE: 20 BRPM | SYSTOLIC BLOOD PRESSURE: 127 MMHG | DIASTOLIC BLOOD PRESSURE: 78 MMHG | OXYGEN SATURATION: 98 % | HEART RATE: 78 BPM

## 2020-01-10 LAB
ALBUMIN SERPL ELPH-MCNC: 4 G/DL — SIGNIFICANT CHANGE UP (ref 3.3–5.2)
ALP SERPL-CCNC: 71 U/L — SIGNIFICANT CHANGE UP (ref 40–120)
ALT FLD-CCNC: 13 U/L — SIGNIFICANT CHANGE UP
ANION GAP SERPL CALC-SCNC: 14 MMOL/L — SIGNIFICANT CHANGE UP (ref 5–17)
AST SERPL-CCNC: 13 U/L — SIGNIFICANT CHANGE UP
BASOPHILS # BLD AUTO: 0.04 K/UL — SIGNIFICANT CHANGE UP (ref 0–0.2)
BASOPHILS NFR BLD AUTO: 0.3 % — SIGNIFICANT CHANGE UP (ref 0–2)
BILIRUB SERPL-MCNC: 0.2 MG/DL — LOW (ref 0.4–2)
BUN SERPL-MCNC: 16 MG/DL — SIGNIFICANT CHANGE UP (ref 8–20)
CALCIUM SERPL-MCNC: 9.4 MG/DL — SIGNIFICANT CHANGE UP (ref 8.6–10.2)
CHLORIDE SERPL-SCNC: 101 MMOL/L — SIGNIFICANT CHANGE UP (ref 98–107)
CO2 SERPL-SCNC: 24 MMOL/L — SIGNIFICANT CHANGE UP (ref 22–29)
CREAT SERPL-MCNC: 0.64 MG/DL — SIGNIFICANT CHANGE UP (ref 0.5–1.3)
EOSINOPHIL # BLD AUTO: 0.01 K/UL — SIGNIFICANT CHANGE UP (ref 0–0.5)
EOSINOPHIL NFR BLD AUTO: 0.1 % — SIGNIFICANT CHANGE UP (ref 0–6)
GLUCOSE SERPL-MCNC: 94 MG/DL — SIGNIFICANT CHANGE UP (ref 70–115)
HCT VFR BLD CALC: 36.1 % — SIGNIFICANT CHANGE UP (ref 34.5–45)
HGB BLD-MCNC: 11.2 G/DL — LOW (ref 11.5–15.5)
IMM GRANULOCYTES NFR BLD AUTO: 0.9 % — SIGNIFICANT CHANGE UP (ref 0–1.5)
LYMPHOCYTES # BLD AUTO: 1.89 K/UL — SIGNIFICANT CHANGE UP (ref 1–3.3)
LYMPHOCYTES # BLD AUTO: 14.4 % — SIGNIFICANT CHANGE UP (ref 13–44)
MCHC RBC-ENTMCNC: 25.9 PG — LOW (ref 27–34)
MCHC RBC-ENTMCNC: 31 GM/DL — LOW (ref 32–36)
MCV RBC AUTO: 83.4 FL — SIGNIFICANT CHANGE UP (ref 80–100)
MONOCYTES # BLD AUTO: 0.8 K/UL — SIGNIFICANT CHANGE UP (ref 0–0.9)
MONOCYTES NFR BLD AUTO: 6.1 % — SIGNIFICANT CHANGE UP (ref 2–14)
NEUTROPHILS # BLD AUTO: 10.3 K/UL — HIGH (ref 1.8–7.4)
NEUTROPHILS NFR BLD AUTO: 78.2 % — HIGH (ref 43–77)
PLATELET # BLD AUTO: 603 K/UL — HIGH (ref 150–400)
POTASSIUM SERPL-MCNC: 4.4 MMOL/L — SIGNIFICANT CHANGE UP (ref 3.5–5.3)
POTASSIUM SERPL-SCNC: 4.4 MMOL/L — SIGNIFICANT CHANGE UP (ref 3.5–5.3)
PROT SERPL-MCNC: 7.2 G/DL — SIGNIFICANT CHANGE UP (ref 6.6–8.7)
RBC # BLD: 4.33 M/UL — SIGNIFICANT CHANGE UP (ref 3.8–5.2)
RBC # FLD: 14.6 % — HIGH (ref 10.3–14.5)
SODIUM SERPL-SCNC: 139 MMOL/L — SIGNIFICANT CHANGE UP (ref 135–145)
WBC # BLD: 13.16 K/UL — HIGH (ref 3.8–10.5)
WBC # FLD AUTO: 13.16 K/UL — HIGH (ref 3.8–10.5)

## 2020-01-10 PROCEDURE — 99285 EMERGENCY DEPT VISIT HI MDM: CPT

## 2020-01-10 PROCEDURE — 71250 CT THORAX DX C-: CPT | Mod: 26

## 2020-01-10 PROCEDURE — 94640 AIRWAY INHALATION TREATMENT: CPT

## 2020-01-10 PROCEDURE — 71250 CT THORAX DX C-: CPT

## 2020-01-10 PROCEDURE — 71046 X-RAY EXAM CHEST 2 VIEWS: CPT

## 2020-01-10 PROCEDURE — 36415 COLL VENOUS BLD VENIPUNCTURE: CPT

## 2020-01-10 PROCEDURE — 80053 COMPREHEN METABOLIC PANEL: CPT

## 2020-01-10 PROCEDURE — 93010 ELECTROCARDIOGRAM REPORT: CPT

## 2020-01-10 PROCEDURE — 96374 THER/PROPH/DIAG INJ IV PUSH: CPT

## 2020-01-10 PROCEDURE — 99053 MED SERV 10PM-8AM 24 HR FAC: CPT

## 2020-01-10 PROCEDURE — 93005 ELECTROCARDIOGRAM TRACING: CPT

## 2020-01-10 PROCEDURE — 99285 EMERGENCY DEPT VISIT HI MDM: CPT | Mod: 25

## 2020-01-10 PROCEDURE — 85027 COMPLETE CBC AUTOMATED: CPT

## 2020-01-10 PROCEDURE — T1013: CPT

## 2020-01-10 PROCEDURE — 71046 X-RAY EXAM CHEST 2 VIEWS: CPT | Mod: 26

## 2020-01-10 RX ORDER — MAGNESIUM SULFATE 500 MG/ML
2 VIAL (ML) INJECTION ONCE
Refills: 0 | Status: COMPLETED | OUTPATIENT
Start: 2020-01-10 | End: 2020-01-10

## 2020-01-10 RX ORDER — IPRATROPIUM/ALBUTEROL SULFATE 18-103MCG
3 AEROSOL WITH ADAPTER (GRAM) INHALATION ONCE
Refills: 0 | Status: COMPLETED | OUTPATIENT
Start: 2020-01-10 | End: 2020-01-10

## 2020-01-10 RX ORDER — SODIUM CHLORIDE 9 MG/ML
1000 INJECTION INTRAMUSCULAR; INTRAVENOUS; SUBCUTANEOUS ONCE
Refills: 0 | Status: COMPLETED | OUTPATIENT
Start: 2020-01-10 | End: 2020-01-10

## 2020-01-10 RX ADMIN — Medication 3 MILLILITER(S): at 03:39

## 2020-01-10 RX ADMIN — SODIUM CHLORIDE 1000 MILLILITER(S): 9 INJECTION INTRAMUSCULAR; INTRAVENOUS; SUBCUTANEOUS at 02:58

## 2020-01-10 RX ADMIN — Medication 3 MILLILITER(S): at 02:57

## 2020-01-10 RX ADMIN — Medication 50 GRAM(S): at 03:39

## 2020-01-10 NOTE — ED PROVIDER NOTE - PATIENT PORTAL LINK FT
You can access the FollowMyHealth Patient Portal offered by [facility] by registering at the following website: [email or web address]. By joining [facility]’s FollowMyHealth portal, you will also be able to view your health information using other applications (apps) compatible with our system.

## 2020-01-10 NOTE — ED PROVIDER NOTE - CLINICAL SUMMARY MEDICAL DECISION MAKING FREE TEXT BOX
50 y/o female with hx of asthma presents to the ED c/o shortness of breath x 3 days. Pt was seen and examined by [facility] on Dec 11 and was treated for PNA with vantin and azithro, pt notes meds were finished and she was feeling better but cough returned soon after. will repeat xray, ct, labs, fluids, duonebs mag and reassess

## 2020-01-10 NOTE — ED PROVIDER NOTE - PROGRESS NOTE DETAILS
PA NOTE: neg ct for PNA, pt feeling better after meds, pt feeling better after meds, sating at 97 % on RA, labs and imaging reviewed with pt. given good instructions when to return to ED and importance of f/u.  all incidental findings were discussed with pt as well. pt verbalized understanding.

## 2020-01-10 NOTE — ED ADULT NURSE NOTE - OBJECTIVE STATEMENT
Patient is a 49 year old female, A&Ox3 in no apparent distress. c/o SOB. Patient states she was diagnosed with pneumonia, but still having moments of "chest tightness". Airway patent. Speaking in full sentences. Respirations even, spontaneous, and unlabored. Skin warm and dry. Ambulated in patient with steady gait.

## 2020-01-10 NOTE — ED PROVIDER NOTE - ATTENDING CONTRIBUTION TO CARE
I, Ron Berger, performed a face to face bedside interview with this patient regarding history of present illness, review of symptoms and relevant past medical, social and family history.  I completed an independent physical examination. I have communicated the patient’s plan of care and disposition with the ACP.  49 year old female with PMH asthma and recent pna presents with SOB and cough x 3 days. No chets pain, LE edema  Gen: NAD, well appearing  CV: RRR  Pul: trace wheeze b/l  Abd: Soft, non-distended, non-tender  Neuro: no focal deficits  Pt improved, no hypoxia, no increased resp effort, no pna, stable for dc

## 2020-01-10 NOTE — ED ADULT NURSE NOTE - NSIMPLEMENTINTERV_GEN_ALL_ED
Implemented All Universal Safety Interventions:  [place] to call system. Call bell, personal items and telephone within reach. Instruct patient to call for assistance. Room bathroom lighting operational. Non-slip footwear when patient is off stretcher. Physically safe environment: no spills, clutter or unnecessary equipment. Stretcher in lowest position, wheels locked, appropriate side rails in place.

## 2020-01-10 NOTE — ED PROVIDER NOTE - OBJECTIVE STATEMENT
50 y/o female with hx of asthma presents to the ED c/o shortness of breath x 3 days. Pt was seen and examined by [facility] on Dec 11 and was treated for PNA with vantin and azithro, pt notes meds were finished and she was feeling better but cough returned soon after. Pt was seen by PCP and given prednisone 40 mg on day 3 of the meds but notes worsening shortness of breath and wheezing. Pt states she has been using her pump with little relief. Cough has been non productive in nature. Pt denies, fevers, chills, abdominal pain, calf pain, hx of clots, hx of surgeries, coughing up blood, chest pain. no hx of intubation.

## 2020-01-10 NOTE — ED ADULT NURSE NOTE - CHIEF COMPLAINT QUOTE
Patient is alert and oriented x4 c/o chest tightness with SOB and wheezing x few days. diagnosed with pneumonia few days ago. Diminished breath sounds with slight expiratory wheeze noted. denies n/v/d. color normal for ethnicityy..

## 2021-01-11 NOTE — ED CDU PROVIDER INITIAL DAY NOTE - CHIEF COMPLAINT
The patient is a 48y Female complaining of chest pain. Siliq Counseling:  I discussed with the patient the risks of Siliq including but not limited to new or worsening depression, suicidal thoughts and behavior, immunosuppression, malignancy, posterior leukoencephalopathy syndrome, and serious infections.  The patient understands that monitoring is required including a PPD at baseline and must alert us or the primary physician if symptoms of infection or other concerning signs are noted. There is also a special program designed to monitor depression which is required with Siliq.

## 2024-01-15 NOTE — ED PROVIDER NOTE - CCCP TRG CHIEF CMPLNT
Virtual Visit Details    Type of service:  Telephone Visit   Phone call duration: 15 minutes    shortness of breath